# Patient Record
Sex: FEMALE | Race: BLACK OR AFRICAN AMERICAN | Employment: FULL TIME | URBAN - METROPOLITAN AREA
[De-identification: names, ages, dates, MRNs, and addresses within clinical notes are randomized per-mention and may not be internally consistent; named-entity substitution may affect disease eponyms.]

---

## 2022-12-06 ENCOUNTER — OFFICE VISIT (OUTPATIENT)
Dept: SURGERY | Age: 72
End: 2022-12-06
Payer: COMMERCIAL

## 2022-12-06 ENCOUNTER — TELEPHONE (OUTPATIENT)
Dept: SURGERY | Age: 72
End: 2022-12-06

## 2022-12-06 VITALS
WEIGHT: 196 LBS | SYSTOLIC BLOOD PRESSURE: 143 MMHG | TEMPERATURE: 97.9 F | OXYGEN SATURATION: 96 % | HEART RATE: 73 BPM | DIASTOLIC BLOOD PRESSURE: 78 MMHG | RESPIRATION RATE: 18 BRPM | BODY MASS INDEX: 33.46 KG/M2 | HEIGHT: 64 IN

## 2022-12-06 DIAGNOSIS — K64.4 RESIDUAL HEMORRHOIDAL SKIN TAGS: Primary | ICD-10-CM

## 2022-12-06 PROBLEM — E66.9 CLASS 1 OBESITY IN ADULT: Status: ACTIVE | Noted: 2022-12-06

## 2022-12-06 PROCEDURE — 1123F ACP DISCUSS/DSCN MKR DOCD: CPT | Performed by: SURGERY

## 2022-12-06 PROCEDURE — 99202 OFFICE O/P NEW SF 15 MIN: CPT | Performed by: SURGERY

## 2022-12-06 RX ORDER — TRAMADOL HYDROCHLORIDE 50 MG/1
50 TABLET ORAL
Qty: 10 TABLET | Refills: 0 | Status: SHIPPED | OUTPATIENT
Start: 2022-12-06 | End: 2022-12-09

## 2022-12-06 NOTE — PROGRESS NOTES
Rashida Gottlieb is a 71 y.o. female who is referred by [facility] [place] for further evaluation of perianal skin tags. Ms. Royce Kitchen tells me that she began experiencing perianal pain several days ago. No associated perianal drainage or bleeding. No problems with constipation or diarrhea. Colonoscopy and EGD in the past, were unremarkable. (By report. Records not available in [facility]) Seen at [facility] and found to have residual hemorrhoidal skin tags. She has otherwise been in her usual state of health. Past Medical History:   Diagnosis Date    Class 1 obesity in adult 12/6/2022    GERD (gastroesophageal reflux disease)     Heart palpitations     High cholesterol     Hypertension     Residual hemorrhoidal skin tags 12/6/2022     Past Surgical History:   Procedure Laterality Date    HX HYSTERECTOMY      HX ORTHOPAEDIC Right     knee surgery    HX ORTHOPAEDIC Left     shoulder surgery    HX TONSILLECTOMY      HX TUBAL LIGATION       History reviewed. No pertinent family history. Social History     Socioeconomic History    Marital status:    Tobacco Use    Smoking status: Former    Smokeless tobacco: Never   Substance and Sexual Activity    Alcohol use: No    Drug use: No     Review of systems negative except as noted. Review of Systems   Constitutional:  Negative for chills and fever. Gastrointestinal:  Negative for blood in stool, constipation, diarrhea, nausea and vomiting. Perianal pain. Psychiatric/Behavioral:  The patient is nervous/anxious. Physical Exam  Vitals reviewed. Exam conducted with a chaperone present. Constitutional:       General: She is not in acute distress. Appearance: Normal appearance. She is obese. HENT:      Head: Normocephalic and atraumatic. Eyes:      General: No scleral icterus. Cardiovascular:      Rate and Rhythm: Normal rate and regular rhythm.    Pulmonary:      Effort: Pulmonary effort is normal.      Breath sounds: Normal breath sounds. Abdominal:      General: There is no distension. Palpations: Abdomen is soft. Tenderness: There is no abdominal tenderness. Genitourinary:     Comments: No fistula-in-ano or perianal abscess. There is a perianal skin tag. No prolapsed internal hemorrhoids. Small internal hemorrhoids on anoscopy. Musculoskeletal:         General: Normal range of motion. Cervical back: Neck supple. Lymphadenopathy:      Cervical: No cervical adenopathy. Neurological:      General: No focal deficit present. Mental Status: She is alert. ASSESSMENT and PLAN  Ms. Zavaleta is a 70 yo female with a perianal skin tag. In view of the findings on H and P, suggested to Ms. Zavaleta that she try stool softeners, sitz baths and proctofoam. Also suggested that she try and increase water and fiber in diet. Will see in one more week or earlier if need be. Also, Rx for Tramadol. Ms. Fredy Chavarria may ultimately benefit from surgical intervention. Discussed plan with Ms. Zavaleta and she is agreeable.        CC: Jeanette Szymanski MD   Patient [facility] Cormier

## 2022-12-13 ENCOUNTER — OFFICE VISIT (OUTPATIENT)
Dept: SURGERY | Age: 72
End: 2022-12-13
Payer: COMMERCIAL

## 2022-12-13 VITALS
OXYGEN SATURATION: 98 % | RESPIRATION RATE: 18 BRPM | BODY MASS INDEX: 33.12 KG/M2 | HEIGHT: 64 IN | TEMPERATURE: 98.2 F | WEIGHT: 194 LBS | HEART RATE: 69 BPM | SYSTOLIC BLOOD PRESSURE: 139 MMHG | DIASTOLIC BLOOD PRESSURE: 77 MMHG

## 2022-12-13 DIAGNOSIS — K64.4 RESIDUAL HEMORRHOIDAL SKIN TAGS: Primary | ICD-10-CM

## 2022-12-13 PROCEDURE — 1123F ACP DISCUSS/DSCN MKR DOCD: CPT | Performed by: SURGERY

## 2022-12-13 PROCEDURE — 99212 OFFICE O/P EST SF 10 MIN: CPT | Performed by: SURGERY

## 2022-12-13 NOTE — PROGRESS NOTES
Salvador Hankins is a 71 y.o. female who returns for follow up of perianal skin tags. Ms. Ewelina Alford was last seen on December 6, 2022 for evaluation of perianal skin tags. Doing fairly well since then. No significant change in perianal pain and itching. Ms. Ewelina Alford also reports pain with bowel movements. No rectal bleeding. She has otherwise been in her usual state of health. Past Medical History:   Diagnosis Date    Class 1 obesity in adult 12/6/2022    GERD (gastroesophageal reflux disease)     Heart palpitations     High cholesterol     Hypertension     Residual hemorrhoidal skin tags 12/6/2022     Past Surgical History:   Procedure Laterality Date    HX HYSTERECTOMY      HX ORTHOPAEDIC Right     knee surgery    HX ORTHOPAEDIC Left     shoulder surgery    HX TONSILLECTOMY      HX TUBAL LIGATION       History reviewed. No pertinent family history. Social History     Socioeconomic History    Marital status:    Tobacco Use    Smoking status: Former    Smokeless tobacco: Never   Substance and Sexual Activity    Alcohol use: No    Drug use: No     Review of systems negative except as noted. Review of Systems   Gastrointestinal:  Negative for blood in stool. Perianal pain and itching. Physical Exam  Vitals reviewed. Exam conducted with a chaperone present. Constitutional:       General: She is not in acute distress. Appearance: Normal appearance. She is obese. HENT:      Head: Normocephalic and atraumatic. Cardiovascular:      Rate and Rhythm: Normal rate and regular rhythm. Pulmonary:      Effort: Pulmonary effort is normal.      Breath sounds: Normal breath sounds. Abdominal:      General: There is no distension. Palpations: Abdomen is soft. Tenderness: There is no abdominal tenderness. Genitourinary:     Comments: No significant change in perianal skin tags. Musculoskeletal:         General: Normal range of motion.    Neurological:      General: No focal deficit present. Mental Status: She is alert. ASSESSMENT and PLAN  Ms. Zavaleta is a 72 yo female with perianal skin tags. In view of the findings on H and P, she should benefit from examination under anesthesia, rigid sigmoidoscopy and excision of the perianal skin tags. Discussed procedure with her including risks of bleeding and infection. She understands and wishes to proceed. I have tentatively scheduled Ms. Zavaleta for surgery on December 28, 2022 at [facility] and will see her back in the office postoperatively. Discussed plan with Ms. Zavaleta and she is agreeable.       CC: Jeanette Szymanski MD

## 2022-12-13 NOTE — PROGRESS NOTES
Identified pt with two pt identifiers (name and ). Reviewed chart in preparation for visit and have obtained necessary documentation. Martha Carlos is a 71 y.o. female  Chief Complaint   Patient presents with    Follow-up    Hemorrhoids     Visit Vitals  /77 (BP 1 Location: Right arm, BP Patient Position: Sitting, BP Cuff Size: Large adult)   Pulse 69   Temp 98.2 °F (36.8 °C) (Oral)   Resp 18   Ht 5' 4\" (1.626 m)   Wt 194 lb (88 kg)   SpO2 98%   BMI 33.30 kg/m²       1. Have you been to the ER, urgent care clinic since your last visit? Hospitalized since your last visit? No    2. Have you seen or consulted any other health care providers outside of the [address] [place] [address] since your last visit? Include any pap smears or colon screening.  No

## 2022-12-14 RX ORDER — BUPIVACAINE HYDROCHLORIDE 2.5 MG/ML
30 INJECTION, SOLUTION EPIDURAL; INFILTRATION; INTRACAUDAL ONCE
OUTPATIENT
Start: 2022-12-14 | End: 2022-12-14

## 2022-12-14 RX ORDER — ACETAMINOPHEN 325 MG/1
1000 TABLET ORAL ONCE
OUTPATIENT
Start: 2022-12-14 | End: 2022-12-15

## 2022-12-16 ENCOUNTER — HOSPITAL ENCOUNTER (OUTPATIENT)
Dept: PREADMISSION TESTING | Age: 72
End: 2022-12-16
Payer: COMMERCIAL

## 2022-12-16 VITALS
HEART RATE: 67 BPM | DIASTOLIC BLOOD PRESSURE: 74 MMHG | HEIGHT: 64 IN | WEIGHT: 195.33 LBS | BODY MASS INDEX: 33.35 KG/M2 | SYSTOLIC BLOOD PRESSURE: 145 MMHG | TEMPERATURE: 98 F

## 2022-12-16 LAB
ATRIAL RATE: 60 BPM
CALCULATED P AXIS, ECG09: 56 DEGREES
CALCULATED R AXIS, ECG10: 35 DEGREES
CALCULATED T AXIS, ECG11: 29 DEGREES
DIAGNOSIS, 93000: NORMAL
P-R INTERVAL, ECG05: 186 MS
Q-T INTERVAL, ECG07: 440 MS
QRS DURATION, ECG06: 84 MS
QTC CALCULATION (BEZET), ECG08: 440 MS
VENTRICULAR RATE, ECG03: 60 BPM

## 2022-12-16 PROCEDURE — 93005 ELECTROCARDIOGRAM TRACING: CPT

## 2022-12-16 RX ORDER — LANOLIN ALCOHOL/MO/W.PET/CERES
400 CREAM (GRAM) TOPICAL EVERY EVENING
COMMUNITY

## 2022-12-16 RX ORDER — BACLOFEN 10 MG/1
10 TABLET ORAL
COMMUNITY

## 2022-12-16 NOTE — PERIOP NOTES
COPY OF COVID CARD PLACED ON CHART. SLEEP APNEA SCORE 4/10. PT REQ SLEEP MEDICINE REFERRAL. PT'S NAME AND PT LABEL GIVEN TO ALAYNA Ibarra NP.    PT TO HAVE LABS AND EKG PER ANESTHESIA PROTOCOL. EKG DONE IN PAT APPT AS PT HAS HTN AND HAS NOT HAD ONE \"IN SOME TIME\". PT ALSO NEEDS A CBC AND BMP. PT STATES SHE \"JUST HAD LABS DONE\" ON WED, 12/14/22 AT [facility] THAT HER PCP HAD ORDERED. I CALLED HER PCP OFFICE/DR. CUADRA/[phone]. SPOKE TO /PATRICE WHO STATES PT DID INDEED HAVE A CBC AND CMP ON WED 12/14/22 AT [facility] AT [facility]. GAVE PATRICE MY PAT FAX NUMBER AND SHE IS TO FAX LAB RESULTS. PT STATES SHE REALLY DOES NOT WANT TO BE STUCK AGAIN SINCE THIS WAS JUST DONE. WILL AWAIT RESULTS. CBC AND CMP RESULTS REC'D. PLACED ON CHART.

## 2022-12-16 NOTE — PERIOP NOTES
[address] INSTRUCTIONS    Surgery Date:   12/28/22    Your surgeon's office or [facility] staff will call you between 4 PM- 8 PM the day before surgery with your arrival time. If your surgery is on a Monday, you will receive a call the preceding Friday. Please report to [facility]/Admitting on the 1st floor. Bring your insurance card, photo identification, and any copayment ( if applicable). If you are going home the same day of your surgery, you must have a responsible adult to drive you home. You need to have a responsible adult to stay with you the first 24 hours after surgery and you should not drive a car for 24 hours following your surgery. Do NOT eat any solid foods after midnight the night before surgery including candy, mint or gum. You may drink clear liquids from midnight until 1 hour prior to your arrival. You may drink up to 12 ounces at one time every 4 hours. Please note special instructions, if applicable, below for medications. Do NOT drink alcohol or smoke 24 hours before surgery. STOP smoking for 14 days prior as it helps with breathing and healing after surgery. If you are being admitted to the hospital, please leave personal belongings/luggage in your car until you have an assigned hospital room number. Please wear comfortable clothes. Wear your glasses instead of contacts. We ask that all money, jewelry and valuables be left at home. Wear no make up, particularly mascara, the day of surgery. All body piercings, rings, and jewelry need to be removed and left at home. Please remove any nail polish or artifical nails from your fingernails. Please wear your hair loose or down. Please no pony-tails, buns, or any metal hair accessories. If you shower the morning of surgery, please do not apply any lotions or powders afterwards. You may wear deodorant, unless having breast surgery. Do not shave any body area within 24 hours of your surgery.   Please follow all instructions to avoid any potential surgical cancellation. Should your physical condition change, (i.e. fever, cold, flu, etc.) please notify your surgeon as soon as possible. It is important to be on time. If a situation occurs where you may be delayed, please call:  [phone] / [phone] on the day of surgery. The Preadmission Testing staff can be reached at [phone]. Special instructions: [address] DAY OF SURGERY IF YOU'D LIKE IT ON FILE AT [facility]      Current Outpatient Medications   Medication Sig    cholecalciferol, vitamin D3, (VITAMIN D3 PO) Take 1 Tablet by mouth daily. OTHER Take  by mouth daily. COGNIUM FOR MEMORY    baclofen (LIORESAL) 10 mg tablet Take 10 mg by mouth nightly.    magnesium oxide (MAG-OX) 400 mg tablet Take 400 mg by mouth every evening. aspirin delayed-release 81 mg tablet Take 81 mg by mouth daily. Indications: PREVENTION OF TRANSIENT ISCHEMIC ATTACKS    estradiol (ESTRACE) 1 mg tablet Take 1 mg by mouth daily. Indications: PRE-MENOPAUSAL OSTEOPOROSIS    atenolol (TENORMIN) 25 mg tablet Take 25 mg by mouth daily. Hydrochlorothiazide 12.5 mg tablet Take 25 mg by mouth daily. LOSARTAN POTASSIUM (COZAAR PO) Take 100 mg by mouth daily. ibuprofen (MOTRIN) 200 mg tablet Take 400 mg by mouth every six (6) hours as needed. PRAVASTATIN SODIUM (PRAVASTATIN PO) Take 20 mg by mouth every evening. FLUOXETINE HCL (FLUOXETINE PO) Take 40 mg by mouth nightly. LORAZEPAM PO Take 0.5 mg by mouth as needed. loratadine (CLARITIN) 10 mg tablet Take 10 mg by mouth daily. No current facility-administered medications for this encounter.         YOU MUST ONLY TAKE THESE MEDICATIONS THE MORNING OF SURGERY WITH A SIP OF WATER: CLARITIN, ATENOLOL    MEDICATIONS TO TAKE THE MORNING OF SURGERY ONLY IF NEEDED: NONE    HOLD these prescription medications BEFORE Surgery: NONE    Ask your surgeon/prescribing physician about when/if to STOP taking these medications: ESTRADIOL    Stop all vitamins, herbal medicines and Aspirin containing products 7 days prior to surgery. Stop any non-steroidal anti-inflammatory drugs (i.e. Ibuprofen, Naproxen, Advil, Aleve) 3 days before surgery. You may take Tylenol. If you are currently taking Plavix, Coumadin,or any other blood-thinning/anticoagulant medication contact your prescribing physician for instructions. Eating and Drinking Before Surgery    You may eat a regular dinner at the usual time on the day before your surgery. Do NOT eat any solid foods after midnight unless your arrival time at the hospital is 3pm or later. You may drink clear liquids only from 12 midnight until 1 hours prior to your arrival time at the hospital on the day of your surgery. Do NOT drink alcohol. Clear liquids include:  Water  Fruit juices without pulp( i.e. apple juice)  Carbonated beverages  Black coffee (no cream/milk)  Tea (no cream/milk)  Gatorade  You may drink up to 12-16 ounces at one time every 4 hours between the hours of midnight and 1 hour before your arrival time at the hospital. Example- if your arrival time at the hospital is 6am, you may drink 12-16 ounces of clear liquids no later than 5am.  If your arrival time at the hospital is 3pm or later, you may eat a light breakfast before 8am.  A light breakfast includes: Toast or bagel (no butter)  Black coffee (no cream/milk)  Tea (no cream/milk)  Fruit juices without pulp ( i.e. apple juice)  Do NOT eat meat, eggs, vegetables or fruit  If you have any questions, please contact your surgeon's office. Preventing Infections Before and After - Your Surgery    IMPORTANT INSTRUCTIONS    You play an important role in your health and preparation for surgery. To reduce the germs on your skin you will need to shower with CHG soap (Chorhexidine gluconate 4%) two times before surgery.     CHG soap (Hibiclens, Hex-A-Clens or store brand)  CHG soap will be provided at your Preadmission Testing (PAT) appointment. If you do not have a PAT appointment before surgery, you may arrange to  CHG soap from our office or purchase CHG soap at a pharmacy, grocery or department store. You need to purchase TWO 4 ounce bottles to use for your 2 showers. Steps to follow:  Elma Castillo your hair with your normal shampoo and your body with regular soap and rinse well to remove shampoo and soap from your skin. Wet a clean washcloth and turn off the shower. Put CHG soap on washcloth and apply to your entire body from the neck down. Do not use on your head, face or private parts(genitals). Do not use CHG soap on open sores, wounds or areas of skin irritation. Wash you body gently for 5 minutes. Do not wash your skin too hard. This soap does not create lather. Pay special attention to your underarms and from your belly button to your feet. Turn the shower back on and rinse well to get CHG soap off your body. Pat your skin dry with a clean, dry towel. Do not apply lotions or moisturizer. Put on clean clothes and sleep on fresh bed sheets and do not allow pets to sleep with you. Shower with CHG soap 2 times before your surgery  The evening before your surgery  The morning of your surgery      Tips to help prevent infections after your surgery:  Protect your surgical wound from germs:  Hand washing is the most important thing you and your caregivers can do to prevent infections. Keep your bandage clean and dry! Do not touch your surgical wound. Use clean, freshly washed towels and washcloths every time you shower; do not share bath linens with others. Until your surgical wound is healed, wear clothing and sleep on bed linens each day that are clean and freshly washed. Do not allow pets to sleep in your bed with you or touch your surgical wound. Do not smoke - smoking delays wound healing. This may be a good time to stop smoking.   If you have diabetes, it is important for you to manage your blood sugar levels properly before your surgery as well as after your surgery. Poorly managed blood sugar levels slow down wound healing and prevent you from healing completely. Patient Information Regarding COVID Restrictions      Day of Procedure    Please park in the parking deck or any designated visitor parking lot. Enter the facility through the Main Entrance of the hospital.  On the day of surgery, please provide the cell phone number of the person who will be waiting for you to the Patient Access representative at the time of registration. Masks are highly recommended in the hospital, but not required. Once your procedure and the immediate recovery period is completed, a nurse in the recovery area will contact your designated visitor to inform them of your room number or to otherwise review other pertinent information regarding your care. Social distancing practices are strongly encouraged in waiting areas and the cafeteria. The patient was contacted  in person. She verbalized understanding of all instructions does not  need reinforcement.

## 2022-12-16 NOTE — PERIOP NOTES
Leandra Merlos) was seen at [address] [place] [address] on 12/16/22. They have surgery scheduled with Dr. Spenser Nix on 12/28/22. The results of their ANA LILIA STOP-BANG Scoring Tool indicates the potential need for a referral to sleep medicine. Results forwarded to PCP for follow-up/further recommendations regarding evaluation for sleep apnea. ANA LILIA STOP-BANG Scoring Tool    [] Does the patient snore loud enough to be heard through a closed door? [x] Does the patient often feel tired, fatigued or sleep during the daytime or after a \"good\" night's sleep? [] Has anyone observed the patient stop breathing during their sleep? [x] Does the patient have or are they treated for HTN? [] Is the patient's BMI >35? [x] Is the patient's neck size >17\"(male) or >16\"(female)? [x] Is the patient older than 48?  [] Is the patient male?     ANA LILIA Risk Score: [address], [place]

## 2022-12-17 ENCOUNTER — TELEPHONE (OUTPATIENT)
Dept: SURGERY | Age: 72
End: 2022-12-17

## 2022-12-17 NOTE — TELEPHONE ENCOUNTER
Call center message ===========[phone]  Fri 16-Dec-22 02:48p  ======================================  Name: Abilio Beatty  Regular Dr.: Maren Escobar        Patient: Abilio Beatty          PtDOB: [phone]          Phone: [phone]        Message: Pls cl re; requesting  to  speak with nurse, upcoming surgery  12/28.

## 2022-12-19 NOTE — TELEPHONE ENCOUNTER
Patient returning call and stated she was experiencing pain over the weekend and wanted to see if she needs to stop medication prior to surgery.

## 2022-12-20 NOTE — TELEPHONE ENCOUNTER
Patient identified with two patient identifiers. Patient states she was instructed to call by PATs to see if she needs to stop Estradiol she is currently taking prior to surgery. Patient also requesting prescription pain medication prior to surgery for break through anal pain when moving bowels. Patient informed per Dr. Franci rodriguez to continue Estradiol. Patient informed pain medication will not be prescribed preoperatively. She will continue with stool softener as needed and tylenol and or ibuprofen OTC as needed. Patient expressed understanding.

## 2022-12-28 ENCOUNTER — HOSPITAL ENCOUNTER (OUTPATIENT)
Age: 72
Setting detail: OUTPATIENT SURGERY
Discharge: HOME | End: 2022-12-28
Attending: SURGERY | Admitting: SURGERY
Payer: MEDICARE

## 2022-12-28 ENCOUNTER — ANESTHESIA EVENT (OUTPATIENT)
Dept: SURGERY | Age: 72
End: 2022-12-28
Payer: MEDICARE

## 2022-12-28 ENCOUNTER — ANESTHESIA (OUTPATIENT)
Dept: SURGERY | Age: 72
End: 2022-12-28
Payer: MEDICARE

## 2022-12-28 VITALS
DIASTOLIC BLOOD PRESSURE: 54 MMHG | TEMPERATURE: 97.6 F | RESPIRATION RATE: 12 BRPM | HEART RATE: 81 BPM | SYSTOLIC BLOOD PRESSURE: 132 MMHG | OXYGEN SATURATION: 94 %

## 2022-12-28 DIAGNOSIS — K64.4 RESIDUAL HEMORRHOIDAL SKIN TAGS: Primary | ICD-10-CM

## 2022-12-28 PROCEDURE — 74011250636 HC RX REV CODE- 250/636: Performed by: SURGERY

## 2022-12-28 PROCEDURE — 74011250637 HC RX REV CODE- 250/637: Performed by: SURGERY

## 2022-12-28 PROCEDURE — 74011000250 HC RX REV CODE- 250: Performed by: ANESTHESIOLOGY

## 2022-12-28 PROCEDURE — 88304 TISSUE EXAM BY PATHOLOGIST: CPT

## 2022-12-28 PROCEDURE — 77030031753 HC SHR ENDO COAG HARM J&J -E: Performed by: SURGERY

## 2022-12-28 PROCEDURE — 2709999900 HC NON-CHARGEABLE SUPPLY: Performed by: SURGERY

## 2022-12-28 PROCEDURE — 77030026438 HC STYL ET INTUB CARD -A: Performed by: ANESTHESIOLOGY

## 2022-12-28 PROCEDURE — 74011250636 HC RX REV CODE- 250/636: Performed by: ANESTHESIOLOGY

## 2022-12-28 PROCEDURE — 76210000016 HC OR PH I REC 1 TO 1.5 HR: Performed by: SURGERY

## 2022-12-28 PROCEDURE — 76060000034 HC ANESTHESIA 1.5 TO 2 HR: Performed by: SURGERY

## 2022-12-28 PROCEDURE — 2709999900 HC NON-CHARGEABLE SUPPLY

## 2022-12-28 PROCEDURE — 76210000020 HC REC RM PH II FIRST 0.5 HR: Performed by: SURGERY

## 2022-12-28 PROCEDURE — 77030002888 HC SUT CHRMC J&J -A: Performed by: SURGERY

## 2022-12-28 PROCEDURE — 77030040361 HC SLV COMPR DVT MDII -B: Performed by: SURGERY

## 2022-12-28 PROCEDURE — 74011000250 HC RX REV CODE- 250: Performed by: SURGERY

## 2022-12-28 PROCEDURE — 77030008684 HC TU ET CUF COVD -B: Performed by: ANESTHESIOLOGY

## 2022-12-28 PROCEDURE — 76010000149 HC OR TIME 1 TO 1.5 HR: Performed by: SURGERY

## 2022-12-28 PROCEDURE — 77030042556 HC PNCL CAUT -B: Performed by: SURGERY

## 2022-12-28 RX ORDER — ROCURONIUM BROMIDE 10 MG/ML
INJECTION, SOLUTION INTRAVENOUS AS NEEDED
Status: DISCONTINUED | OUTPATIENT
Start: 2022-12-28 | End: 2022-12-28 | Stop reason: HOSPADM

## 2022-12-28 RX ORDER — SODIUM CHLORIDE 0.9 % (FLUSH) 0.9 %
5-40 SYRINGE (ML) INJECTION EVERY 8 HOURS
Status: DISCONTINUED | OUTPATIENT
Start: 2022-12-28 | End: 2022-12-28 | Stop reason: HOSPADM

## 2022-12-28 RX ORDER — SODIUM CHLORIDE, SODIUM LACTATE, POTASSIUM CHLORIDE, CALCIUM CHLORIDE 600; 310; 30; 20 MG/100ML; MG/100ML; MG/100ML; MG/100ML
100 INJECTION, SOLUTION INTRAVENOUS CONTINUOUS
Status: DISCONTINUED | OUTPATIENT
Start: 2022-12-28 | End: 2022-12-28 | Stop reason: HOSPADM

## 2022-12-28 RX ORDER — ONDANSETRON 2 MG/ML
INJECTION INTRAMUSCULAR; INTRAVENOUS AS NEEDED
Status: DISCONTINUED | OUTPATIENT
Start: 2022-12-28 | End: 2022-12-28 | Stop reason: HOSPADM

## 2022-12-28 RX ORDER — ACETAMINOPHEN 500 MG
1000 TABLET ORAL ONCE
Status: COMPLETED | OUTPATIENT
Start: 2022-12-28 | End: 2022-12-28

## 2022-12-28 RX ORDER — FENTANYL CITRATE 50 UG/ML
INJECTION, SOLUTION INTRAMUSCULAR; INTRAVENOUS AS NEEDED
Status: DISCONTINUED | OUTPATIENT
Start: 2022-12-28 | End: 2022-12-28 | Stop reason: HOSPADM

## 2022-12-28 RX ORDER — PROPOFOL 10 MG/ML
INJECTION, EMULSION INTRAVENOUS AS NEEDED
Status: DISCONTINUED | OUTPATIENT
Start: 2022-12-28 | End: 2022-12-28 | Stop reason: HOSPADM

## 2022-12-28 RX ORDER — HYDROMORPHONE HYDROCHLORIDE 1 MG/ML
0.5 INJECTION, SOLUTION INTRAMUSCULAR; INTRAVENOUS; SUBCUTANEOUS
Status: DISCONTINUED | OUTPATIENT
Start: 2022-12-28 | End: 2022-12-28 | Stop reason: HOSPADM

## 2022-12-28 RX ORDER — FENTANYL CITRATE 50 UG/ML
50 INJECTION, SOLUTION INTRAMUSCULAR; INTRAVENOUS AS NEEDED
Status: DISCONTINUED | OUTPATIENT
Start: 2022-12-28 | End: 2022-12-28 | Stop reason: HOSPADM

## 2022-12-28 RX ORDER — MIDAZOLAM HYDROCHLORIDE 1 MG/ML
1 INJECTION, SOLUTION INTRAMUSCULAR; INTRAVENOUS AS NEEDED
Status: DISCONTINUED | OUTPATIENT
Start: 2022-12-28 | End: 2022-12-28 | Stop reason: HOSPADM

## 2022-12-28 RX ORDER — DEXAMETHASONE SODIUM PHOSPHATE 4 MG/ML
INJECTION, SOLUTION INTRA-ARTICULAR; INTRALESIONAL; INTRAMUSCULAR; INTRAVENOUS; SOFT TISSUE AS NEEDED
Status: DISCONTINUED | OUTPATIENT
Start: 2022-12-28 | End: 2022-12-28 | Stop reason: HOSPADM

## 2022-12-28 RX ORDER — ROPIVACAINE HYDROCHLORIDE 5 MG/ML
30 INJECTION, SOLUTION EPIDURAL; INFILTRATION; PERINEURAL AS NEEDED
Status: DISCONTINUED | OUTPATIENT
Start: 2022-12-28 | End: 2022-12-28 | Stop reason: HOSPADM

## 2022-12-28 RX ORDER — DIPHENHYDRAMINE HYDROCHLORIDE 50 MG/ML
12.5 INJECTION, SOLUTION INTRAMUSCULAR; INTRAVENOUS AS NEEDED
Status: DISCONTINUED | OUTPATIENT
Start: 2022-12-28 | End: 2022-12-28 | Stop reason: HOSPADM

## 2022-12-28 RX ORDER — SODIUM CHLORIDE 0.9 % (FLUSH) 0.9 %
5-40 SYRINGE (ML) INJECTION AS NEEDED
Status: DISCONTINUED | OUTPATIENT
Start: 2022-12-28 | End: 2022-12-28 | Stop reason: HOSPADM

## 2022-12-28 RX ORDER — OXYCODONE HYDROCHLORIDE 5 MG/1
5 TABLET ORAL
Qty: 8 TABLET | Refills: 0 | Status: SHIPPED | OUTPATIENT
Start: 2022-12-28 | End: 2022-12-31

## 2022-12-28 RX ORDER — DEXMEDETOMIDINE HYDROCHLORIDE 100 UG/ML
INJECTION, SOLUTION INTRAVENOUS AS NEEDED
Status: DISCONTINUED | OUTPATIENT
Start: 2022-12-28 | End: 2022-12-28 | Stop reason: HOSPADM

## 2022-12-28 RX ORDER — MIDAZOLAM HYDROCHLORIDE 1 MG/ML
INJECTION, SOLUTION INTRAMUSCULAR; INTRAVENOUS AS NEEDED
Status: DISCONTINUED | OUTPATIENT
Start: 2022-12-28 | End: 2022-12-28 | Stop reason: HOSPADM

## 2022-12-28 RX ORDER — LABETALOL HYDROCHLORIDE 5 MG/ML
INJECTION, SOLUTION INTRAVENOUS AS NEEDED
Status: DISCONTINUED | OUTPATIENT
Start: 2022-12-28 | End: 2022-12-28 | Stop reason: HOSPADM

## 2022-12-28 RX ORDER — SUCCINYLCHOLINE CHLORIDE 20 MG/ML
INJECTION INTRAMUSCULAR; INTRAVENOUS AS NEEDED
Status: DISCONTINUED | OUTPATIENT
Start: 2022-12-28 | End: 2022-12-28 | Stop reason: HOSPADM

## 2022-12-28 RX ORDER — LIDOCAINE HYDROCHLORIDE 20 MG/ML
INJECTION, SOLUTION EPIDURAL; INFILTRATION; INTRACAUDAL; PERINEURAL AS NEEDED
Status: DISCONTINUED | OUTPATIENT
Start: 2022-12-28 | End: 2022-12-28 | Stop reason: HOSPADM

## 2022-12-28 RX ORDER — ONDANSETRON 2 MG/ML
4 INJECTION INTRAMUSCULAR; INTRAVENOUS AS NEEDED
Status: DISCONTINUED | OUTPATIENT
Start: 2022-12-28 | End: 2022-12-28 | Stop reason: HOSPADM

## 2022-12-28 RX ORDER — SODIUM CHLORIDE 9 MG/ML
25 INJECTION, SOLUTION INTRAVENOUS CONTINUOUS
Status: DISCONTINUED | OUTPATIENT
Start: 2022-12-28 | End: 2022-12-28 | Stop reason: HOSPADM

## 2022-12-28 RX ORDER — OXYCODONE HYDROCHLORIDE 5 MG/1
5 TABLET ORAL
Status: COMPLETED | OUTPATIENT
Start: 2022-12-28 | End: 2022-12-28

## 2022-12-28 RX ORDER — BUPIVACAINE HYDROCHLORIDE AND EPINEPHRINE 5; 5 MG/ML; UG/ML
INJECTION, SOLUTION EPIDURAL; INTRACAUDAL; PERINEURAL AS NEEDED
Status: DISCONTINUED | OUTPATIENT
Start: 2022-12-28 | End: 2022-12-28 | Stop reason: HOSPADM

## 2022-12-28 RX ORDER — SODIUM CHLORIDE, SODIUM LACTATE, POTASSIUM CHLORIDE, CALCIUM CHLORIDE 600; 310; 30; 20 MG/100ML; MG/100ML; MG/100ML; MG/100ML
INJECTION, SOLUTION INTRAVENOUS
Status: DISCONTINUED | OUTPATIENT
Start: 2022-12-28 | End: 2022-12-28 | Stop reason: HOSPADM

## 2022-12-28 RX ORDER — ACETAMINOPHEN 325 MG/1
650 TABLET ORAL ONCE
Status: DISCONTINUED | OUTPATIENT
Start: 2022-12-28 | End: 2022-12-28 | Stop reason: HOSPADM

## 2022-12-28 RX ORDER — MORPHINE SULFATE 2 MG/ML
2 INJECTION, SOLUTION INTRAMUSCULAR; INTRAVENOUS
Status: DISCONTINUED | OUTPATIENT
Start: 2022-12-28 | End: 2022-12-28 | Stop reason: HOSPADM

## 2022-12-28 RX ORDER — ACETAMINOPHEN 500 MG
1000 TABLET ORAL
Qty: 20 TABLET | Refills: 2 | Status: SHIPPED | OUTPATIENT
Start: 2022-12-28

## 2022-12-28 RX ORDER — KETOROLAC TROMETHAMINE 30 MG/ML
INJECTION, SOLUTION INTRAMUSCULAR; INTRAVENOUS AS NEEDED
Status: DISCONTINUED | OUTPATIENT
Start: 2022-12-28 | End: 2022-12-28 | Stop reason: HOSPADM

## 2022-12-28 RX ORDER — FENTANYL CITRATE 50 UG/ML
25 INJECTION, SOLUTION INTRAMUSCULAR; INTRAVENOUS
Status: COMPLETED | OUTPATIENT
Start: 2022-12-28 | End: 2022-12-28

## 2022-12-28 RX ORDER — LIDOCAINE HYDROCHLORIDE 10 MG/ML
0.1 INJECTION, SOLUTION EPIDURAL; INFILTRATION; INTRACAUDAL; PERINEURAL AS NEEDED
Status: DISCONTINUED | OUTPATIENT
Start: 2022-12-28 | End: 2022-12-28 | Stop reason: HOSPADM

## 2022-12-28 RX ORDER — MIDAZOLAM HYDROCHLORIDE 1 MG/ML
0.5 INJECTION, SOLUTION INTRAMUSCULAR; INTRAVENOUS
Status: DISCONTINUED | OUTPATIENT
Start: 2022-12-28 | End: 2022-12-28 | Stop reason: HOSPADM

## 2022-12-28 RX ADMIN — SUGAMMADEX 200 MG: 100 INJECTION, SOLUTION INTRAVENOUS at 14:53

## 2022-12-28 RX ADMIN — DEXAMETHASONE SODIUM PHOSPHATE 8 MG: 4 INJECTION, SOLUTION INTRAMUSCULAR; INTRAVENOUS at 13:55

## 2022-12-28 RX ADMIN — SODIUM CHLORIDE, POTASSIUM CHLORIDE, SODIUM LACTATE AND CALCIUM CHLORIDE 100 ML/HR: 600; 310; 30; 20 INJECTION, SOLUTION INTRAVENOUS at 12:29

## 2022-12-28 RX ADMIN — FENTANYL CITRATE 25 MCG: 0.05 INJECTION, SOLUTION INTRAMUSCULAR; INTRAVENOUS at 15:45

## 2022-12-28 RX ADMIN — DEXMEDETOMIDINE HYDROCHLORIDE 5 MCG: 100 INJECTION, SOLUTION, CONCENTRATE INTRAVENOUS at 14:39

## 2022-12-28 RX ADMIN — ROCURONIUM BROMIDE 20 MG: 10 SOLUTION INTRAVENOUS at 13:51

## 2022-12-28 RX ADMIN — ROCURONIUM BROMIDE 10 MG: 10 SOLUTION INTRAVENOUS at 13:47

## 2022-12-28 RX ADMIN — FENTANYL CITRATE 25 MCG: 0.05 INJECTION, SOLUTION INTRAMUSCULAR; INTRAVENOUS at 16:09

## 2022-12-28 RX ADMIN — FENTANYL CITRATE 50 MCG: 50 INJECTION INTRAMUSCULAR; INTRAVENOUS at 14:21

## 2022-12-28 RX ADMIN — WATER 2 G: 1 INJECTION INTRAMUSCULAR; INTRAVENOUS; SUBCUTANEOUS at 14:06

## 2022-12-28 RX ADMIN — ONDANSETRON HYDROCHLORIDE 4 MG: 2 INJECTION, SOLUTION INTRAMUSCULAR; INTRAVENOUS at 14:12

## 2022-12-28 RX ADMIN — LABETALOL HYDROCHLORIDE 5 MG: 5 INJECTION INTRAVENOUS at 15:16

## 2022-12-28 RX ADMIN — FENTANYL CITRATE 50 MCG: 50 INJECTION INTRAMUSCULAR; INTRAVENOUS at 13:47

## 2022-12-28 RX ADMIN — LIDOCAINE HYDROCHLORIDE 60 MG: 20 INJECTION, SOLUTION EPIDURAL; INFILTRATION; INTRACAUDAL; PERINEURAL at 13:47

## 2022-12-28 RX ADMIN — FENTANYL CITRATE 25 MCG: 0.05 INJECTION, SOLUTION INTRAMUSCULAR; INTRAVENOUS at 15:32

## 2022-12-28 RX ADMIN — KETOROLAC TROMETHAMINE 15 MG: 30 INJECTION, SOLUTION INTRAMUSCULAR; INTRAVENOUS at 14:39

## 2022-12-28 RX ADMIN — ROCURONIUM BROMIDE 20 MG: 10 SOLUTION INTRAVENOUS at 14:17

## 2022-12-28 RX ADMIN — FENTANYL CITRATE 25 MCG: 0.05 INJECTION, SOLUTION INTRAMUSCULAR; INTRAVENOUS at 16:03

## 2022-12-28 RX ADMIN — SUCCINYLCHOLINE CHLORIDE 120 MG: 20 INJECTION, SOLUTION INTRAMUSCULAR; INTRAVENOUS at 13:47

## 2022-12-28 RX ADMIN — OXYCODONE 5 MG: 5 TABLET ORAL at 16:22

## 2022-12-28 RX ADMIN — SODIUM CHLORIDE, POTASSIUM CHLORIDE, SODIUM LACTATE AND CALCIUM CHLORIDE: 600; 310; 30; 20 INJECTION, SOLUTION INTRAVENOUS at 13:26

## 2022-12-28 RX ADMIN — MIDAZOLAM 1 MG: 1 INJECTION INTRAMUSCULAR; INTRAVENOUS at 13:40

## 2022-12-28 RX ADMIN — PROPOFOL 165 MG: 10 INJECTION, EMULSION INTRAVENOUS at 13:47

## 2022-12-28 RX ADMIN — ACETAMINOPHEN 1000 MG: 500 TABLET, FILM COATED ORAL at 12:00

## 2022-12-28 NOTE — DISCHARGE INSTRUCTIONS
Patient Discharge Instructions    Mikayla Perez / [id number] : 1950    Admitted 2022 Discharged: 2022       It is important that you take the medication exactly as they are prescribed. Keep your medication in the bottles provided by the pharmacist and keep a list of the medication names, dosages, and times to be taken in your wallet. Do not take other medications without consulting your doctor. What to do at Home    Recommended diet: Regular. Recommended activity: No Restrictions. No Driving While Taking Oxycodone. Tylenol 1000 mg every 6 hours for two days and then 1000 mg every 6 hours as needed for pain. Can alternate Motrin 400 mg with Tylenol. Oxycodone as needed for severe pain. Sitz Baths Daily. May Take Shower or [place] Roxo after removing dressing. Can remove dressing later today. If you experience any of the following symptoms Fevers, Chills, Nausea, Vomitting, Redness or Drainage at Surgical Site(s) or Any Other Questions or Concerns Please Call -  [phone]. Follow-up with Dr. Anabel Munroe in 10-14 days. Information obtained by :  I understand that if any problems occur once I am at home I am to contact my physician. I understand and acknowledge receipt of the instructions indicated above.                                                                                                                                            Physician's or R.N.'s Signature                                                                  Date/Time                                                                                                                                              Patient or Representative Signature                                                          Date/Time    ______________________________________________________________________    Anesthesia Discharge Instructions    After general anesthesia or intervenous sedation, for 24 hours or while taking prescription Narcotics:  Limit your activities  Do not drive or operate hazardous machinery  If you have not urinated within 8 hours after discharge, please contact your surgeon on call. Do not make important personal or business decisions  Do not drink alcoholic beverages    Report the following to your surgeon:  Excessive pain, swelling, redness or odor of or around the surgical area  Temperature over 100.5 degrees  Nausea and vomiting lasting longer than 4 hours or if unable to take medication  Any signs of decreased circulation or nerve impairment to extremity:  Change in color, persistent numbness, tingling, coldness or increased pain.   Any questions    Took Tylenol 1000mg at 12 noon, no Tylenol before 6 pm  Took Toradol at 2:40 pm no Ibuprofen products before 8:40 pm tonight  Took Roxicodone at 4:30 pm. No Roxicodone before 8:30 pm

## 2022-12-28 NOTE — OP NOTES
[address]   OPERATIVE REPORT    Name:  Srinath Rae  MR#:  [id number]  :  1950  ACCOUNT #:  [de-identified]  DATE OF SERVICE:  2022    PREOPERATIVE DIAGNOSIS:  Perianal skin tag. POSTOPERATIVE DIAGNOSIS:  Perianal skin tag. PROCEDURES PERFORMED:  1. Examination under anesthesia. 2.  Excise perianal skin tag. SURGEON:  Rayray Gill MD    ASSISTANT:  Stanford Dubin, RN    ANESTHESIA:  General endotracheal.    COMPLICATIONS:  None. SPECIMENS REMOVED:  Perianal skin tag to Pathology. IMPLANTS:  None. ESTIMATED BLOOD LOSS:  Approximately 10 mL. IV FLUIDS:  Crystalloid 700 mL. DRAINS:  None. INDICATIONS:  The patient is a 71-year-old female with a symptomatic perianal skin tag. Ms. Idania Slater is brought to the operating room at this time for examination under anesthesia and excision of the perianal skin tag. The risks of the procedure, including but not limited to, infection, bleeding, and recurrence were discussed in detail with the patient. Ms. Idania Slater understood and wished to proceed. PROCEDURE:  After consent was obtained, the patient was brought to the operating room where she was intubated while in the supine position on the stretcher. Following the induction of an adequate level of general anesthesia via the endotracheal tube, compression devices were placed on both lower extremities. The patient was then placed in the prone position on the operating room table. After ensuring that all pressure points were well-padded, the buttocks were taped apart, prepped with Betadine, and draped as a sterile field. On examination, a perianal skin tag was noted. There was no perianal abscess or fistula in ano. Digital rectal examination was performed and no suspicious mass lesions were identified. The anal speculum was then brought on the field and carefully inserted. Small internal hemorrhoids were identified.   However, no other mucosal abnormalities were identified. Attention was then directed towards the perianal skin tag. The skin tag was grasped and excised with the Harmonic scalpel. The specimen was passed off the field and submitted for histopathologic evaluation. The mucosa and anoderm were then closed with a running, locked 2-0 chromic suture. Care care was taken to incorporate the sphincter muscle in order to eliminate any dead space. The suture line was inspected and found to be intact and hemostatic. Local anesthetic was infiltrated and a piece of Gelfoam was placed over the suture line. There was an area where the mucosa had split and several bleeders were carefully cauterized. A piece of Gelfoam was placed over this area as well. Dry dressings were then applied. The patient was returned to the supine position on the stretcher. She was awakened from her general anesthetic and extubated in the operating room. The patient was brought to the recovery room in stable condition having tolerated the procedure well. At the conclusion of the procedure, all sponge counts, instrument counts, and needle counts were reported as correct x2.       Ella Saini MD [id number]  D:  12/28/2022 15:09  T:  12/28/2022 16:02  JOB #:  [id number]  CC:  MD Courtney Mei MD

## 2022-12-28 NOTE — ANESTHESIA POSTPROCEDURE EVALUATION
Procedure(s):  EXAMINATION UNDER ANESTHESIA, EXCISE PERIANAL SKIN TAGS. general    Anesthesia Post Evaluation        Patient location during evaluation: PACU  Note status: Adequate. Level of consciousness: responsive to verbal stimuli and sleepy but conscious  Pain management: satisfactory to patient  Airway patency: patent  Anesthetic complications: no  Cardiovascular status: acceptable  Respiratory status: acceptable  Hydration status: acceptable  Comments: +Post-Anesthesia Evaluation and Assessment    Patient: Shima Gaytan MRN: [id number]  SSN: [id number]   YOB: 1950  Age: 71 y.o. Sex: female          Cardiovascular Function/Vital Signs    BP (!) 132/54   Pulse 86   Temp 36.4 °C (97.6 °F)   Resp 19   SpO2 95%     Patient is status post Procedure(s):  EXAMINATION UNDER ANESTHESIA, EXCISE PERIANAL SKIN TAGS. Nausea/Vomiting: Controlled. Postoperative hydration reviewed and adequate. Pain:  Pain Scale 1: Numeric (0 - 10) (12/28/22 1609)  Pain Intensity 1: 5 (12/28/22 1609)   Managed. Neurological Status:   Neuro (WDL): Within Defined Limits (12/28/22 1603)   At baseline. Mental Status and Level of Consciousness: Arousable. Pulmonary Status:   O2 Device: None (Room air) (12/28/22 1545)   Adequate oxygenation and airway patent. Complications related to anesthesia: None    Post-anesthesia assessment completed. No concerns. I have evaluated the patient and the patient is stable and ready to be discharged from PACU . Signed By: Baron Oly MD    12/28/2022        INITIAL Post-op Vital signs:   Vitals Value Taken Time   /54 12/28/22 1615   Temp 36.4 °C (97.6 °F) 12/28/22 1610   Pulse 87 12/28/22 1618   Resp 17 12/28/22 1618   SpO2 96 % 12/28/22 1618   Vitals shown include unvalidated device data.

## 2022-12-28 NOTE — ANESTHESIA PREPROCEDURE EVALUATION
Relevant Problems   No relevant active problems       Anesthetic History   No history of anesthetic complications            Review of Systems / Medical History  Patient summary reviewed, nursing notes reviewed and pertinent labs reviewed    Pulmonary  Within defined limits                 Neuro/Psych   Within defined limits           Cardiovascular  Within defined limits  Hypertension              Exercise tolerance: >4 METS     GI/Hepatic/Renal  Within defined limits   GERD           Endo/Other  Within defined limits      Obesity and arthritis     Other Findings              Physical Exam    Airway  Mallampati: II  TM Distance: > 6 cm  Neck ROM: normal range of motion   Mouth opening: Normal     Cardiovascular  Regular rate and rhythm,  S1 and S2 normal,  no murmur, click, rub, or gallop             Dental  No notable dental hx       Pulmonary  Breath sounds clear to auscultation               Abdominal  GI exam deferred       Other Findings            Anesthetic Plan    ASA: 2  Anesthesia type: general          Induction: Intravenous  Anesthetic plan and risks discussed with: Patient

## 2022-12-28 NOTE — PERIOP NOTES
Reviewed discharge instructions with patient and  in person. Verbalized understanding. Patient will call office if additional questions arise.

## 2022-12-28 NOTE — BRIEF OP NOTE
Brief Postoperative Note    Patient: Jil Rand  YOB: 1950  MRN: [id number]    Date of Procedure: 12/28/2022     Pre-Op Diagnosis:  Perianal Skin Tag. Post-Op Diagnosis:  Same. Procedure(s):   Examination Under Anesthesia. Excise Perianal Skin Tag. Surgeon(s):  Brian Babin MD    Surgical Assistant: Garcia Willard RN    Anesthesia: General     Estimated Blood Loss (mL): Approximately 10 ml. Complications: None    Specimens:   ID Type Source Tests Collected by Time Destination   1 : Perianal Skin Tag Skin Anus  Brian Babin MD 12/28/2022 1426 Pathology        Implants: * No implants in log *    Drains: * No LDAs found *    Findings: No perianal abscess or fistula-in-ano. Perianal skin tag. Small internal hemorrhoids.     Electronically Signed by Noel Francis MD on 12/28/2022 at 3:02 PM

## 2022-12-28 NOTE — H&P
Date of Surgery Update:  Huseyin Romo was seen and examined. History and physical has been reviewed. The patient has been examined. There have been no significant clinical changes since the completion of the originally dated History and Physical.    Signed By: Steven Bello MD     December 28, 2022 1:18 PM         Please note from the office and include the additional information below:    Past Medical History  Past Medical History:   Diagnosis Date    Arthritis     Class 1 obesity in adult 12/06/2022    GERD (gastroesophageal reflux disease)     Heart palpitations     High cholesterol     Hypertension     Psychiatric disorder     ANXIETY    Residual hemorrhoidal skin tags 12/06/2022        Past Surgical History  Past Surgical History:   Procedure Laterality Date    HX HYSTERECTOMY      HX ORTHOPAEDIC Right     RT KNEE MENISCUS REPAIR    HX ORTHOPAEDIC Left     SHOULDER ARTHROSCOPY    HX ORTHOPAEDIC Right     TRIGGER FINGER RELEASE    HX TONSILLECTOMY      HX TUBAL LIGATION          Social History  The patient Huseyin Romo  reports that she quit smoking about 23 years ago. Her smoking use included cigarettes. She has never used smokeless tobacco. She reports that she does not drink alcohol and does not use drugs.      Family History  Family History   Problem Relation Age of Onset    Cancer Mother         ESOPHAGEAL    Diabetes Father     Heart Disease Father     Anesth Problems Neg Hx

## 2023-01-04 ENCOUNTER — TELEPHONE (OUTPATIENT)
Dept: SURGERY | Age: 73
End: 2023-01-04

## 2023-01-04 NOTE — TELEPHONE ENCOUNTER
I called the patient back and she said she is having pain, she said when she has a bowel movement she has pain and she is passing blood with stool. She is using Fiber and her stools are soft and she is not bearing down to pass her stool but said there is a lot of pressure. She is passing gas, she is doing a sitz bath after stools. She is wearing a pad and said it always has blood on it which I told her was to be expected. She is out of her pain medication and said the Tylenol and the Motrin do not help. She said she cant sit and she said she did not have an extensive surgery and did not think she would hurt like this. She has Tucks pads at home and she is going to try them today. She would like to be seen sooner than her scheduled appointment. I offered her an appointment for tomorrow but she said she will take Friday. I transferred her to the  to schedule her appointment.

## 2023-01-04 NOTE — TELEPHONE ENCOUNTER
Patient called stating she is having a lot of pain around the area of her incision. Patient also stated when urinating or having a bowel movement there is blood.

## 2023-01-06 ENCOUNTER — OFFICE VISIT (OUTPATIENT)
Dept: SURGERY | Age: 73
End: 2023-01-06
Payer: MEDICARE

## 2023-01-06 VITALS
HEIGHT: 64 IN | RESPIRATION RATE: 20 BRPM | HEART RATE: 72 BPM | BODY MASS INDEX: 33.46 KG/M2 | DIASTOLIC BLOOD PRESSURE: 82 MMHG | WEIGHT: 196 LBS | TEMPERATURE: 98.2 F | OXYGEN SATURATION: 96 % | SYSTOLIC BLOOD PRESSURE: 129 MMHG

## 2023-01-06 DIAGNOSIS — Z09 POSTOPERATIVE EXAMINATION: Primary | ICD-10-CM

## 2023-01-06 PROCEDURE — 99024 POSTOP FOLLOW-UP VISIT: CPT

## 2023-01-06 NOTE — PROGRESS NOTES
CC: Post Operative state    Subjective:     Carisa Gomez is a 71 y.o. female presents for postop care 9 days s/p examination under anesthesia and excise perianal skin tag. Patient states she has been having a lot of pain to perianal surgical incision. Reports last night pain was \"15 out of 10\" after having a bowel movement. States pain is \"mainly with bowel movements and sitting\". Still has some bloody drainage on peripad, but \"not much\". She has not been taking any pain medication to alleviate symptoms. Tolerating a regular diet; No nausea or vomiting. Bowel movements are regular and soft. Denies any straining or constipation. The patient is voiding without difficulty. Patient denies fever, chest pain, or shortness of breath. Review of Systems:  A comprehensive review of systems was negative except for that written above      Ms. Zavaleta has a reminder for a \"due or due soon\" health maintenance. I have asked that she contact her primary care provider for follow-up on this health maintenance. Objective:     Visit Vitals  /82 (BP 1 Location: Left upper arm, BP Patient Position: Sitting, BP Cuff Size: Large adult)   Pulse 72   Temp 98.2 °F (36.8 °C)   Resp 20   Ht 5' 4\" (1.626 m)   Wt 196 lb (88.9 kg)   SpO2 96%   BMI 33.64 kg/m²       General: alert, cooperative, no distress, appears stated age  CV: Regular rate and rhythm  Pulmonary: Lungs clear to auscultation; unlabored  Perianal surgical Incision:  mild swelling noted, healing well, no surrounding erythema or induration. Scant amount of bleeding noted on irais pad. No odor    Assessment:       ICD-10-CM ICD-9-CM    1. Postoperative examination  Z09 V67.00           Plan:   2+ weeks s/p   Reviewed pathology with patient   Skin, anus, excision:        Benign fibroepithelial polyp. Wound care discussed. Continue with sits baths with warm water after bowel movements and as needed. Continue to use peripad to collect any drainage.   May apply Nupercainal ointment to help alleviate pain. May take ibuprofen 400 mg every 6-8 hours or Tylenol 1000 mg every 8 hours if needed for pain. Activity as tolerated  Janice Zavaleta verbalized understanding and questions were answered to the best of my knowledge and ability. Return precautions and red flags discussed. Instructed patient to call with any questions or concerns.   Follow up in one week or sooner if needed for wound check        > 15 minutes were spent with patient with greater than 50% of that time spent face to face counseling    Orvel Most, NP  [facility]   1/6/2023

## 2023-01-06 NOTE — PATIENT INSTRUCTIONS
- Continue with sitz bath with warm water after bowel movements and as needed    - Continue to use ERICA-pad to collect any drainage    - May apply Nupercainal ointment to help alleviate pain    - May take ibuprofen 400 mg every 6-8 hours or Tylenol 1000 mg every 8 hours if needed for pain. Please do not exceed 4000 mg of Tylenol in 24 hours.     Red Flags = call the office [phone]     Fever > 101  Increased redness on or around incision, warmth  Foul smelling drainage  Bleeding that is continuous

## 2023-01-06 NOTE — PROGRESS NOTES
Identified pt with two pt identifiers (name and ). Reviewed chart in preparation for visit and have obtained necessary documentation. Christal Mohr is a 71 y.o. female  Chief Complaint   Patient presents with    Surgical Follow-up     9 days s/p excision of perianal skin tag     Visit Vitals  /82 (BP 1 Location: Left upper arm, BP Patient Position: Sitting, BP Cuff Size: Large adult)   Pulse 72   Temp 98.2 °F (36.8 °C)   Resp 20   Ht 5' 4\" (1.626 m)   Wt 196 lb (88.9 kg)   SpO2 96%   BMI 33.64 kg/m²       1. Have you been to the ER, urgent care clinic since your last visit? Hospitalized since your last visit? No    2. Have you seen or consulted any other health care providers outside of the [address] [place] [address] since your last visit? Include any pap smears or colon screening.  No

## 2023-01-13 ENCOUNTER — OFFICE VISIT (OUTPATIENT)
Dept: SURGERY | Age: 73
End: 2023-01-13
Payer: MEDICARE

## 2023-01-13 VITALS
RESPIRATION RATE: 20 BRPM | HEART RATE: 74 BPM | SYSTOLIC BLOOD PRESSURE: 138 MMHG | BODY MASS INDEX: 32.69 KG/M2 | HEIGHT: 64 IN | WEIGHT: 191.5 LBS | OXYGEN SATURATION: 95 % | DIASTOLIC BLOOD PRESSURE: 85 MMHG | TEMPERATURE: 98 F

## 2023-01-13 DIAGNOSIS — Z09 POSTOPERATIVE EXAMINATION: Primary | ICD-10-CM

## 2023-01-13 PROCEDURE — 99024 POSTOP FOLLOW-UP VISIT: CPT | Performed by: NURSE PRACTITIONER

## 2023-01-13 NOTE — PROGRESS NOTES
Subjective:      Emigdio Hall is a 71 y.o. female presents for postop care  2 weeks following perianal skin tag removal.   She still complains of soreness. She is using Preparation H. She is not taking anything for pain. She noticed some bleeding the other day after shopping. She is also having some bleeding after bowel movements. Ms. Fredy Chavarria has a reminder for a \"due or due soon\" health maintenance. I have asked that she contact her primary care provider for follow-up on this health maintenance. Objective:     Visit Vitals  /85 (BP 1 Location: Right upper arm, BP Patient Position: Sitting, BP Cuff Size: Large adult)   Pulse 74   Temp 98 °F (36.7 °C)   Resp 20   Ht 5' 4\" (1.626 m)   Wt 191 lb 8 oz (86.9 kg)   SpO2 95%   BMI 32.87 kg/m²       General:  alert, cooperative       Perianal area Still a mild amount of swelling. Healing well. No redness or inflammation noted. No bleeding on pad. No odor. Assessment:     Doing well postoperatively. Plan:     Follow-up as needed. May increase activity as tolerated. Limit lifting. Continue sitz bath. Advised to take ibuprofen 400 mg daily to see if this helps with inflammation and pain. Recommended ordering a doughnut to help with sitting. Call if she has any other questions.

## 2023-01-13 NOTE — PROGRESS NOTES
Identified pt with two pt identifiers (name and ). Reviewed chart in preparation for visit and have obtained necessary documentation. Francie Lima is a 71 y.o. female  Chief Complaint   Patient presents with    Surgical Follow-up     2 weeks s/p excision of perianal skin tag     Visit Vitals  /85 (BP 1 Location: Right upper arm, BP Patient Position: Sitting, BP Cuff Size: Large adult)   Pulse 74   Temp 98 °F (36.7 °C)   Resp 20   Ht 5' 4\" (1.626 m)   Wt 191 lb 8 oz (86.9 kg)   SpO2 95%   BMI 32.87 kg/m²       1. Have you been to the ER, urgent care clinic since your last visit? Hospitalized since your last visit? No    2. Have you seen or consulted any other health care providers outside of the [address] [place] [address] since your last visit? Include any pap smears or colon screening.  No

## 2023-02-07 ENCOUNTER — OFFICE VISIT (OUTPATIENT)
Dept: SURGERY | Age: 73
End: 2023-02-07
Payer: MEDICARE

## 2023-02-07 VITALS
HEIGHT: 64 IN | BODY MASS INDEX: 33.12 KG/M2 | HEART RATE: 63 BPM | DIASTOLIC BLOOD PRESSURE: 76 MMHG | TEMPERATURE: 45.1 F | SYSTOLIC BLOOD PRESSURE: 147 MMHG | WEIGHT: 194 LBS | RESPIRATION RATE: 16 BRPM | OXYGEN SATURATION: 95 %

## 2023-02-07 DIAGNOSIS — K64.4 RESIDUAL HEMORRHOIDAL SKIN TAGS: Primary | ICD-10-CM

## 2023-02-07 PROCEDURE — 99024 POSTOP FOLLOW-UP VISIT: CPT | Performed by: SURGERY

## 2023-02-07 NOTE — PROGRESS NOTES
Identified pt with two pt identifiers (name and ). Reviewed chart in preparation for visit and have obtained necessary documentation. Joey Mccann is a 71 y.o. female  No chief complaint on file. Visit Vitals  BP (!) 147/76   Pulse 63   Temp (!) 45.1 °F (7.3 °C) (Oral)   Resp 16   Ht 5' 4\" (1.626 m)   Wt 194 lb (88 kg)   SpO2 95%   BMI 33.30 kg/m²       1. Have you been to the ER, urgent care clinic since your last visit? Hospitalized since your last visit? No    2. Have you seen or consulted any other health care providers outside of the [address] [place] [address] since your last visit? Include any pap smears or colon screening. No    Patient and provider made aware of elevated BP x2. Patient asymptomatic. Patient reminded to monitor BP, continue to take BP medications if prescribed, and follow up with PCP/Cardiologist.  Patient expressed understanding and agreement.

## 2023-02-07 NOTE — PROGRESS NOTES
Paul Marino is a 71 y.o. female who returns for post-operative evaluation. Ms. Jesse Avelar is s/p examination under anesthesia and excision of perianal skin tag on 2022. Last seen on 2023. Doing well since then. Perianal pain improved, although Ms. Zavaleta reports an intermittent \"pinching\" sensation. Moving bowels. No further rectal bleeding. No other complaints today. She has otherwise been in her usual state of health. Past Medical History:   Diagnosis Date    Arthritis     Class 1 obesity in adult 2022    GERD (gastroesophageal reflux disease)     Heart palpitations     High cholesterol     Hypertension     Psychiatric disorder     ANXIETY    Residual hemorrhoidal skin tags 2022     Past Surgical History:   Procedure Laterality Date    HX HYSTERECTOMY      HX ORTHOPAEDIC Right     RT KNEE MENISCUS REPAIR    HX ORTHOPAEDIC Left     SHOULDER ARTHROSCOPY    HX ORTHOPAEDIC Right     TRIGGER FINGER RELEASE    HX OTHER SURGICAL  2022    Excise perianal szymanski tag by Dr Vale Holland    HX TONSILLECTOMY      HX TUBAL LIGATION       Family History   Problem Relation Age of Onset    Cancer Mother         ESOPHAGEAL    Diabetes Father     Heart Disease Father     Anesth Problems Neg Hx      Social History     Socioeconomic History    Marital status:    Tobacco Use    Smoking status: Former     Years: 30.00     Types: Cigarettes     Quit date:      Years since quittin.1    Smokeless tobacco: Never   Vaping Use    Vaping Use: Never used   Substance and Sexual Activity    Alcohol use: No    Drug use: Never     Review of systems negative except as noted. Review of Systems   Constitutional:  Negative for chills and fever. Gastrointestinal:  Negative for blood in stool. Occasional perianal \"pinching\" sensation. Physical Exam  Vitals reviewed. Exam conducted with a chaperone present. Constitutional:       General: She is not in acute distress. Appearance: Normal appearance. She is obese. HENT:      Head: Normocephalic and atraumatic. Cardiovascular:      Rate and Rhythm: Normal rate and regular rhythm. Pulmonary:      Effort: Pulmonary effort is normal.      Breath sounds: Normal breath sounds. Abdominal:      General: There is no distension. Palpations: Abdomen is soft. Tenderness: There is no abdominal tenderness. Genitourinary:     Comments: No apparent fistula-in-ano or perianal abscess. Surgical incision is clean and well healed. No rectal bleeding. Musculoskeletal:         General: Normal range of motion. Neurological:      General: No focal deficit present. Mental Status: She is alert. ASSESSMENT and PLAN  Ms. Zavaleta is doing well post-operatively. I reviewed the operative findings and pathology with Ms. Zavaleta today and reassured her that she is doing well thus far and that the \"pinching\" sensation should improve. Suggested that she increase water and fiber in her diet. Miralax or other laxative as needed. Follow up with Ms. Andry Tellez as scheduled. Will see as needed.       CC: Lenore Kapoor NP

## 2023-05-21 RX ORDER — BACLOFEN 10 MG/1
10 TABLET ORAL NIGHTLY
COMMUNITY

## 2023-05-21 RX ORDER — HYDROCHLOROTHIAZIDE 12.5 MG/1
25 TABLET ORAL DAILY
COMMUNITY

## 2023-05-21 RX ORDER — LORATADINE 10 MG/1
10 TABLET ORAL DAILY
COMMUNITY

## 2023-05-21 RX ORDER — ASPIRIN 81 MG/1
81 TABLET ORAL DAILY
COMMUNITY

## 2023-05-21 RX ORDER — ATENOLOL 25 MG/1
25 TABLET ORAL DAILY
COMMUNITY

## 2023-05-21 RX ORDER — IBUPROFEN 200 MG
400 TABLET ORAL EVERY 6 HOURS PRN
COMMUNITY

## 2023-05-21 RX ORDER — ESTRADIOL 1 MG/1
1 TABLET ORAL DAILY
COMMUNITY

## 2023-05-21 RX ORDER — MAGNESIUM OXIDE 400 MG/1
400 TABLET ORAL EVERY EVENING
COMMUNITY

## 2023-05-21 RX ORDER — ACETAMINOPHEN 500 MG
1000 TABLET ORAL EVERY 6 HOURS PRN
COMMUNITY
Start: 2022-12-28

## 2024-07-22 ENCOUNTER — OFFICE VISIT (OUTPATIENT)
Age: 74
End: 2024-07-22
Payer: MEDICARE

## 2024-07-22 ENCOUNTER — TELEPHONE (OUTPATIENT)
Age: 74
End: 2024-07-22

## 2024-07-22 VITALS
HEIGHT: 64 IN | OXYGEN SATURATION: 95 % | WEIGHT: 175.6 LBS | TEMPERATURE: 98.5 F | HEART RATE: 75 BPM | BODY MASS INDEX: 29.98 KG/M2 | DIASTOLIC BLOOD PRESSURE: 60 MMHG | SYSTOLIC BLOOD PRESSURE: 142 MMHG | RESPIRATION RATE: 17 BRPM

## 2024-07-22 DIAGNOSIS — K64.4 RESIDUAL HEMORRHOIDAL SKIN TAGS: Primary | ICD-10-CM

## 2024-07-22 PROCEDURE — 99213 OFFICE O/P EST LOW 20 MIN: CPT | Performed by: SURGERY

## 2024-07-22 PROCEDURE — 1123F ACP DISCUSS/DSCN MKR DOCD: CPT | Performed by: SURGERY

## 2024-07-22 ASSESSMENT — ENCOUNTER SYMPTOMS
CONSTIPATION: 1
VOMITING: 0
ABDOMINAL PAIN: 0
BLOOD IN STOOL: 0

## 2024-07-22 NOTE — PROGRESS NOTES
Amanda Wang is a 73 y.o. female who returns for evaluation of constipation.    Ms. Wang was last seen on 2023 for post-operative evaluation following examination under anesthesia and excision of a perianal skin tag on 2024. Ms. Wang recently started Ozempic and has had three doses thus far. Recent problems with constipation followed by loose stool after using laxatives as well as Miralax. Ms. Wang reports \"pink\" mucousy stool but no clear h/o rectal bleeding. No fevers or chills. No nausea or vomiting. No sick contacts. No recent abx use. Colonoscopy up to date.  She has otherwise been in her usual state of health.     Past Medical History:   Diagnosis Date    Arthritis     Class 1 obesity in adult 2022    GERD (gastroesophageal reflux disease)     Heart palpitations     High cholesterol     Hypertension     Psychiatric disorder     ANXIETY    Residual hemorrhoidal skin tags 2022     Past Surgical History:   Procedure Laterality Date    HYSTERECTOMY (CERVIX STATUS UNKNOWN)      ORTHOPEDIC SURGERY Right     RT KNEE MENISCUS REPAIR    ORTHOPEDIC SURGERY Left     SHOULDER ARTHROSCOPY    ORTHOPEDIC SURGERY Right     TRIGGER FINGER RELEASE    OTHER SURGICAL HISTORY  2022    Excise perianal cardona tag by Dr Maciej Jacobsen    TONSILLECTOMY      TUBAL LIGATION      US I&D BREAST ABSCESS DEEP  2020    US I&D BREAST ABSCESS DEEP 2020     Family History   Problem Relation Age of Onset    Cancer Mother         ESOPHAGEAL    Diabetes Father     Anesth Problems Neg Hx     Heart Disease Father      Social History     Socioeconomic History    Marital status:      Spouse name: None    Number of children: None    Years of education: None    Highest education level: None   Tobacco Use    Smoking status: Former     Current packs/day: 0.00     Types: Cigarettes     Quit date: 2000     Years since quittin.5    Smokeless tobacco: Never   Substance and Sexual Activity

## 2024-07-22 NOTE — PROGRESS NOTES
Identified patient with two patient identifiers (name and ). Reviewed chart in preparation for visit and have obtained necessary documentation.    Amanda Wang is a 73 y.o. female  Chief Complaint   Patient presents with    Rectal Pain     BP (!) 142/60   Pulse 75   Temp 98.5 °F (36.9 °C) (Oral)   Resp 17   Ht 1.626 m (5' 4\")   Wt 79.7 kg (175 lb 9.6 oz)   SpO2 95%   BMI 30.14 kg/m²     1. Have you been to the ER, urgent care clinic since your last visit?  Hospitalized since your last visit?no    2. Have you seen or consulted any other health care providers outside of the [facility] since your last visit?  Include any pap smears or colon screening. no

## 2024-07-25 ENCOUNTER — TELEPHONE (OUTPATIENT)
Age: 74
End: 2024-07-25

## 2024-07-25 NOTE — TELEPHONE ENCOUNTER
Patient is having rectal pressure, she is not having a lot of bowel movement either, some, but not a lot.

## 2024-07-26 NOTE — TELEPHONE ENCOUNTER
Patient identified with two patient identifiers. Patient states she has continual rectal pressure and pain. States bowel movements are starting to become more firm.  No current C/O abdominal pain, swelling, or rectal bleeding.  Patient states she had last colonoscopy about 5 years ago.  Discussed with provider he would like for to follow up with Dr. Dustin Rubio to reassess.    Patient instructed to scheduled follow up with PCP to reassess and possible refer to GI.  Patient states she had an appointment with him last Monday and cancelled since she was able to get in with us.  She will follow up and reschedule visit and return call if needed.

## (undated) DEVICE — PENCIL SMK EVAC 10 FT BLADE ELECTRD ROCKER FOR TELSCP

## (undated) DEVICE — GLOVE ORANGE PI 8   MSG9080

## (undated) DEVICE — PAD,ABDOMINAL,5"X9",ST,LF,25/BX: Brand: MEDLINE INDUSTRIES, INC.

## (undated) DEVICE — Z INACTIVE USE 2854097 SPONGE GZ W4XL4IN COT 12 PLY TYP VII WVN C FLD DSGN

## (undated) DEVICE — HYPODERMIC SAFETY NEEDLE: Brand: MONOJECT

## (undated) DEVICE — REM POLYHESIVE ADULT PATIENT RETURN ELECTRODE: Brand: VALLEYLAB

## (undated) DEVICE — BASIN ST MAJOR-NO CAUTERY: Brand: MEDLINE INDUSTRIES, INC.

## (undated) DEVICE — HANDLE LT SNAP ON ULT DURABLE LENS FOR TRUMPF ALC DISPOSABLE

## (undated) DEVICE — SUTURE CHROMIC GUT SZ 3-0 L27IN ABSRB BRN L26MM SH 1/2 CIR G122H

## (undated) DEVICE — TOWEL SURG W17XL27IN STD BLU COT NONFENESTRATED PREWASHED

## (undated) DEVICE — TAPE, MEDFIX EZ, SELF WOUND, 4"X2YD: Brand: MEDLINE

## (undated) DEVICE — GLOVE SURG SZ 8 L12IN FNGR THK94MIL STD WHT LTX FREE

## (undated) DEVICE — SOLUTION IRRIG 1000ML 0.9% SOD CHL USP POUR PLAS BTL

## (undated) DEVICE — SUT CHRMC 2-0 27IN SH BRN --

## (undated) DEVICE — SHEAR RMFG HARMONIC FOCUS 9CM -- OEM ITEM L#322125

## (undated) DEVICE — SOL TOP ADH MASTISOL 2/3ML VI -- NDC#00496052348

## (undated) DEVICE — PACK,LAPAROTOMY,2 REINFORCED GOWNS: Brand: MEDLINE

## (undated) DEVICE — GARMENT,MEDLINE,DVT,INT,CALF,MED, GEN2: Brand: MEDLINE

## (undated) DEVICE — PREMIUM WET SKIN PREP TRAY: Brand: MEDLINE INDUSTRIES, INC.

## (undated) DEVICE — SYR 10ML LUER LOK 1/5ML GRAD --